# Patient Record
Sex: MALE | URBAN - METROPOLITAN AREA
[De-identification: names, ages, dates, MRNs, and addresses within clinical notes are randomized per-mention and may not be internally consistent; named-entity substitution may affect disease eponyms.]

---

## 2022-07-19 ENCOUNTER — TELEPHONE (OUTPATIENT)
Dept: OBGYN CLINIC | Facility: HOSPITAL | Age: 52
End: 2022-07-19

## 2022-07-19 NOTE — TELEPHONE ENCOUNTER
Force on request sent to [facility],  Please advise if the following patient can be forced onto the schedule:    Patient: Carmenza Srivastava    : 1970    MRN: [id number]      Call back #: [phone]    Insurance: [facility]    Reason for appointment: Bone on bone in thumb joints    Requested doctor/location: Kalia/Vlad//[place]      Thank you

## 2023-10-18 ENCOUNTER — HOSPITAL ENCOUNTER (OUTPATIENT)
Dept: RADIOLOGY | Facility: HOSPITAL | Age: 53
Discharge: HOME | End: 2023-10-18
Attending: ORTHOPAEDIC SURGERY
Payer: COMMERCIAL

## 2023-10-18 ENCOUNTER — OFFICE VISIT (OUTPATIENT)
Dept: OBGYN CLINIC | Facility: HOSPITAL | Age: 53
End: 2023-10-18

## 2023-10-18 ENCOUNTER — TELEPHONE (OUTPATIENT)
Dept: OBGYN CLINIC | Facility: HOSPITAL | Age: 53
End: 2023-10-18

## 2023-10-18 VITALS
DIASTOLIC BLOOD PRESSURE: 97 MMHG | HEART RATE: 60 BPM | BODY MASS INDEX: 41.83 KG/M2 | SYSTOLIC BLOOD PRESSURE: 157 MMHG | HEIGHT: 71 IN | WEIGHT: 298.8 LBS

## 2023-10-18 DIAGNOSIS — M19.042 DEGENERATIVE ARTHRITIS OF METACARPOPHALANGEAL JOINT OF LEFT THUMB: ICD-10-CM

## 2023-10-18 DIAGNOSIS — M79.641 RIGHT HAND PAIN: ICD-10-CM

## 2023-10-18 DIAGNOSIS — M79.642 PAIN OF LEFT HAND: Primary | ICD-10-CM

## 2023-10-18 DIAGNOSIS — M79.642 PAIN OF LEFT HAND: ICD-10-CM

## 2023-10-18 DIAGNOSIS — M19.041 DEGENERATIVE ARTHRITIS OF METACARPOPHALANGEAL JOINT OF RIGHT THUMB: ICD-10-CM

## 2023-10-18 PROCEDURE — 73130 X-RAY EXAM OF HAND: CPT

## 2023-10-18 RX ORDER — LOSARTAN POTASSIUM 25 MG/1
TABLET ORAL
COMMUNITY
Start: 2023-09-18

## 2023-10-18 RX ADMIN — LIDOCAINE HYDROCHLORIDE 1 ML: 10 INJECTION, SOLUTION INFILTRATION; PERINEURAL at 14:45

## 2023-10-18 RX ADMIN — BETAMETHASONE SODIUM PHOSPHATE AND BETAMETHASONE ACETATE 3 MG: 3; 3 INJECTION, SUSPENSION INTRA-ARTICULAR; INTRALESIONAL; INTRAMUSCULAR; SOFT TISSUE at 14:45

## 2023-10-18 NOTE — PROGRESS NOTES
ASSESSMENT/PLAN:    Assessment:   Arthritis of the MP joint  bilateral    Plan:   New xrays of the right thumb and left thumb were obtained and reviewed at today's visit  Conservative treatments were discussed with the patient that include trying another cortisone injection, custom fit a brace that would mimic a fusion at the MP joint. Wear the brace for labor intensive activities. Patient wishes to proceed with a custom made brace made in occupational therapy to mimic a MP joint fusion for both thumbs. Continue with the heat in the morning and evenings along with trying topical Voltaren gel only at night. If the injections for the MP joint did not give him significant relief, then consider bilateral thumb CMC joint injections due to the pain possibly radiating to MP joint. Surgical intervention fusion of the MP joint    Follow Up:  8  week(s)    To Do Next Visit:  Re-evaluate how the custom made splints helped his pain along with the injection    General Discussions:  Osteoarthritis:  The anatomy and physiology of osteoarthritis was discussed with the patient today in the office. Deterioration of the articular cartilage eventually leads to altered mobility at the joint, resulting in joint subluxation, osteophyte formation, cystic changes, as well as subchondral sclerosis. Eventually, pain, limited mobility, and compensatory hypermobility at surrounding joints may develop. While normal activity and usage of the joint may provide a painful experience to the patient, this typically does not result in damage to the limb. Treatment options include splints to decreased joint edema, pain, and inflammation. Therapy exercises to strengthen the surrounding musculature may relieve pain, but do not alter the overall continued development of osteoarthritis. Oral medications, topical medications, corticosteroid injections may decrease pain and increase overall function.   Eventually, some patients may require surgical intervention. _____________________________________________________  CHIEF COMPLAINT:  Chief Complaint   Patient presents with    Left Hand - Pain    Right Hand - Pain         SUBJECTIVE:  Susan Gil is a 46 y.o. male who presents with Pain  Moderate  Constant  Dull and Aching and Stiffness/LROM to the bilateral thumb. This started  6 month(s) ago as Chronic. He was previously treated treated OA with Dr. Tiburcio Swift who provided cortisone injections into the MP joints of bilateral thumbs. The injections did not give him significant relief along with the thumb Comfort Cool. He recommended a MP joint fusion at that time. Patient wished to seek a second opinion. Radiation: None  Previous Treatments: steroid injections, NSAIDs, bracing, and pain relief creams without relief  Associated symptoms: Stiffness/LROM  Handedness: right  Work status: [address] [employer]    PAST MEDICAL HISTORY:  History reviewed. No pertinent past medical history. PAST SURGICAL HISTORY:  History reviewed. No pertinent surgical history. FAMILY HISTORY:  History reviewed. No pertinent family history. SOCIAL HISTORY:  Social History     Tobacco Use    Smoking status: Never    Smokeless tobacco: Never       MEDICATIONS:    Current Outpatient Medications:     losartan (COZAAR) 25 mg tablet, , Disp: , Rfl:     ALLERGIES:  No Known Allergies    REVIEW OF SYSTEMS:  Pertinent items are noted in HPI. A comprehensive review of systems was negative.     LABS:  HgA1c: No results found for: "HGBA1C"  BMP: No results found for: "GLUCOSE", "CALCIUM", "NA", "K", "CO2", "CL", "BUN", "CREATININE"      _____________________________________________________  PHYSICAL EXAMINATION:  Vital signs: /97   Pulse 60   Ht 5' 11" (1.803 m)   Wt 136 kg (298 lb 12.8 oz)   BMI 41.67 kg/m²   General: well developed and well nourished, alert, oriented times 3, and appears comfortable  Psychiatric: Normal  HEENT: Trachea Midline, No torticollis  Cardiovascular: No discernable arrhythmia  Pulmonary: No wheezing or stridor  Abdomen: No rebound or guarding  Extremities: No peripheral edema  Skin: No masses, erythema, lacerations, fluctation, ulcerations  Neurovascular: Sensation Intact to the Median, Ulnar, Radial Nerve, Motor Intact to the Median, Ulnar, Radial Nerve, and Pulses Intact    MUSCULOSKELETAL EXAMINATION:    right CMC Exam:  No adduction contracture  No hyperextension deformity of MCP joint  Negative localized tenderness over radial and dorsal aspect of thumb (CMC joint)  Grind test is negative for pain and Positive for crepitus  Metacarpal load shift test Positive  No triggering or tenderness over the A1 pulley  negative pain with Finkelstein’s maneuver     No radial or ulnar collateral ligaments laxity of IP and MP joint  Flexion and extension intact    left CMC Exam:  No adduction contracture  No hyperextension deformity of MCP joint  Negative localized tenderness over radial and dorsal aspect of thumb (CMC joint)  Grind test is negative for pain and Positive for crepitus  Metacarpal load shift test positive  No triggering or tenderness over the A1 pulley  Negative pain with Finkelstein’s maneuver         _____________________________________________________  STUDIES REVIEWED:  Images were reviewed in PACS by Dr. Esau Romeo and demonstrate: Xrays of the right hand 3 views: Significant osteoarthritis localized into the carpometacarpal joint and metacarpal phalangeal joint. Ulnar deviation of the metacarpal phalangeal joint. Free osteophytes noted. Xrays of the left hand 3 views: Significant osteoarthritis into the metacarpal phalangeal joint and carpometacarpal joint. Joint space loss with subchondral sclerosis noted. PROCEDURES PERFORMED:  Small joint arthrocentesis: L thumb MCP  Universal Protocol:  Consent: Verbal consent obtained.   Risks and benefits: risks, benefits and alternatives were discussed  Consent given by: patient  Time out: Immediately prior to procedure a "time out" was called to verify the correct patient, procedure, equipment, support staff and site/side marked as required. Timeout called at: 10/18/2023 3:42 PM.  Patient understanding: patient states understanding of the procedure being performed  Site marked: the operative site was marked  Patient identity confirmed: verbally with patient  Supporting Documentation  Indications: pain   Procedure Details  Location: thumb - L thumb MCP  Preparation: Patient was prepped and draped in the usual sterile fashion  Needle size: 25 G  Ultrasound guidance: no  Approach: radial  Medications administered: 1 mL lidocaine 1 %; 3 mg betamethasone acetate-betamethasone sodium phosphate 6 (3-3) mg/mL    Patient tolerance: patient tolerated the procedure well with no immediate complications  Dressing:  Sterile dressing applied      Small joint arthrocentesis: R thumb MCP  Universal Protocol:  Consent: Verbal consent obtained. Risks and benefits: risks, benefits and alternatives were discussed  Consent given by: patient  Time out: Immediately prior to procedure a "time out" was called to verify the correct patient, procedure, equipment, support staff and site/side marked as required.   Timeout called at: 10/18/2023 3:42 PM.  Patient understanding: patient states understanding of the procedure being performed  Site marked: the operative site was marked  Patient identity confirmed: verbally with patient  Supporting Documentation  Indications: pain   Procedure Details  Location: thumb - R thumb MCP  Preparation: Patient was prepped and draped in the usual sterile fashion  Needle size: 25 G  Ultrasound guidance: no  Approach: radial  Medications administered: 1 mL lidocaine 1 %; 3 mg betamethasone acetate-betamethasone sodium phosphate 6 (3-3) mg/mL    Patient tolerance: patient tolerated the procedure well with no immediate complications  Dressing:  Sterile dressing applied        Scribe Attestation      I,:  Adebayo Diaz am acting as a scribe while in the presence of the attending physician.:       I,:  Diana Schaeffer MD personally performed the services described in this documentation    as scribed in my presence.:            Chilo Schafer,:  Adebayo Diaz am acting as a scribe while in the presence of the attending physician.:       I,:  Diana Schaeffer MD personally performed the services described in this documentation    as scribed in my presence.:

## 2023-10-18 NOTE — TELEPHONE ENCOUNTER
Hello,    Patient saw Dr. Aura Ward today and needs an 8 week follow up. Can you help with scheduling an appointment? Thank you!

## 2023-10-19 ENCOUNTER — OFFICE VISIT (OUTPATIENT)
Dept: PHYSICAL THERAPY | Facility: MEDICAL CENTER | Age: 53
End: 2023-10-19
Payer: COMMERCIAL

## 2023-10-19 DIAGNOSIS — M19.042 DEGENERATIVE ARTHRITIS OF METACARPOPHALANGEAL JOINT OF LEFT THUMB: Primary | ICD-10-CM

## 2023-10-19 DIAGNOSIS — M19.041 DEGENERATIVE ARTHRITIS OF METACARPOPHALANGEAL JOINT OF RIGHT THUMB: ICD-10-CM

## 2023-10-19 PROCEDURE — L3913 HFO W/O JOINTS CF: HCPCS | Performed by: PHYSICAL THERAPIST

## 2023-10-19 RX ORDER — BETAMETHASONE SODIUM PHOSPHATE AND BETAMETHASONE ACETATE 3; 3 MG/ML; MG/ML
3 INJECTION, SUSPENSION INTRA-ARTICULAR; INTRALESIONAL; INTRAMUSCULAR; SOFT TISSUE
Status: COMPLETED | OUTPATIENT
Start: 2023-10-18 | End: 2023-10-18

## 2023-10-19 RX ORDER — LIDOCAINE HYDROCHLORIDE 10 MG/ML
1 INJECTION, SOLUTION INFILTRATION; PERINEURAL
Status: COMPLETED | OUTPATIENT
Start: 2023-10-18 | End: 2023-10-18

## 2023-10-19 NOTE — PROGRESS NOTES
Splint    Diagnosis:   1. 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 Degenerative arthritis of metacarpophalangeal joint of right thumb  Ambulatory Referral to PT/OT Hand Therapy         Indication: Functioning/pain relief    Location:  BL thumbs  Supplies: Orthotics  Thermoplastic material    Splint type: MCP joint protective splint  Wearing Schedule: Remove for hygiene only  Describe Position: MCP joint placed in about 30-35 degrees of flexion, CMC and IP free    Precautions: Skin/Universal precautions    Patient or Caregiver expresses understanding of wearing Schedule and Precautions? Yes  Patient or Caregiver able to don/doff orthotic independently? Yes    Written orders provided to patient?  Yes  Orders Obtained: Written  Orders Obtained from: Prasanna Escalona MD

## 2024-02-28 ENCOUNTER — OFFICE VISIT (OUTPATIENT)
Dept: OBGYN CLINIC | Facility: HOSPITAL | Age: 54
End: 2024-02-28
Payer: COMMERCIAL

## 2024-02-28 VITALS
DIASTOLIC BLOOD PRESSURE: 88 MMHG | HEART RATE: 73 BPM | HEIGHT: 71 IN | SYSTOLIC BLOOD PRESSURE: 169 MMHG | WEIGHT: 297.6 LBS | BODY MASS INDEX: 41.66 KG/M2

## 2024-02-28 DIAGNOSIS — M18.0 ARTHRITIS OF CARPOMETACARPAL (CMC) JOINT OF BOTH THUMBS: Primary | ICD-10-CM

## 2024-02-28 DIAGNOSIS — M79.2 NEURITIS: ICD-10-CM

## 2024-02-28 DIAGNOSIS — M19.042 DEGENERATIVE ARTHRITIS OF METACARPOPHALANGEAL JOINT OF LEFT THUMB: ICD-10-CM

## 2024-02-28 DIAGNOSIS — M19.041 DEGENERATIVE ARTHRITIS OF METACARPOPHALANGEAL JOINT OF RIGHT THUMB: ICD-10-CM

## 2024-02-28 PROCEDURE — 20600 DRAIN/INJ JOINT/BURSA W/O US: CPT | Performed by: ORTHOPAEDIC SURGERY

## 2024-02-28 PROCEDURE — 99214 OFFICE O/P EST MOD 30 MIN: CPT | Performed by: ORTHOPAEDIC SURGERY

## 2024-02-28 RX ADMIN — BETAMETHASONE SODIUM PHOSPHATE AND BETAMETHASONE ACETATE 3 MG: 3; 3 INJECTION, SUSPENSION INTRA-ARTICULAR; INTRALESIONAL; INTRAMUSCULAR; SOFT TISSUE at 15:15

## 2024-02-28 RX ADMIN — LIDOCAINE HYDROCHLORIDE 0.5 ML: 10 INJECTION, SOLUTION INFILTRATION; PERINEURAL at 15:15

## 2024-02-28 NOTE — PROGRESS NOTES
ASSESSMENT/PLAN:    Assessment:   Bilateral thumb MP joint arthritis  Bilateral thumb CMC arthritis  Left carpal tunnel transient neuritis    Plan:   Discussed treatment options including continued observation, activity modification, bracing, anti-inflammatories, hand therapy, cortisone injection, versus surgical intervention.  Cortisone injections administered to the bilateral thumb CMC joints today using betamethasone which were beneficial for him immediately following the procedure.  He may continue use of Comfort Cool braces as needed. Reviewed that if symptoms of his carpal tunnel transient neuritis persist consider ultrasound of the carpal tunnel for further evaluation.     Follow Up:  3  month(s)    To Do Next Visit:  Reevaluation    General Discussions:  CMC Arthritis: The anatomy and physiology of carpometacarpal joint arthritis was discussed with the patient today in the office.  Deterioration of the articular cartilage eventually leads to hypermobility at the thumb CMC joint, resulting in joint subluxation, osteophyte formation, cystic changes within the trapezium and base of the first metacarpal, as well as subchondral sclerosis.  Eventually, pain, limited mobility, and compensatory hyperextension at the metacarpophalangeal joint may develop.  While normal activity and usage of the thumb joint may provide a painful experience to the patient, this typically does not result in damage to the thumb or hand.  Treatment options include resting thumb spica splints to decreased joint edema, pain, and inflammation.  Therapy exercises to strengthen the thenar musculature may relieve pain, but do not alter the overall continued development of osteoarthritis.  Oral medications, topical medications, corticosteroid injections may decrease pain and increase overall function.  Eventually, approximately 5% of patients may require surgical intervention.     _____________________________________________________  CHIEF  COMPLAINT:  Chief Complaint   Patient presents with    Left Thumb - Follow-up     MCP- Beta     Right Thumb - Follow-up     MCP- Beta      SUBJECTIVE:  Fredo Blount is a 53 y.o. male who presents for follow up regarding bilateral thumb MP joint arthritis.  At last visit he received cortisone injections to bilateral thumb MP joints which were not beneficial.  He also received custom made thumb braces through occupational therapy which he notes have since broken.  He continues to experience bilateral thumb pain made worse with activities and cold weather.  He also notes numbness and tingling into his small and ring fingers following in injury roughly 1 month ago.  He notes that this has started to resolve.    PAST MEDICAL HISTORY:  Past Medical History:   Diagnosis Date    Dysphagia     Esophageal dysmotility.  Eosinophilic esophagitis.  History of EGDs with Dr Colindres with empiric dilatation in 2009, 2011, 2012.    Mediastinal lymphadenopathy due to sarcoidosis     GABRIEL (obstructive sleep apnea) 12/5/2016    Last Assessment & Plan:  Formatting of this note might be different from the original. Very severe obstructive sleep apnea syndrome with very severe episodic hypoxia.  Will initiate auto CPAP at a range of 8-20 CWP.  The patient will use an interface of his choice.  Nightly use of CPAP and weight loss are recommended.       PAST SURGICAL HISTORY:  Past Surgical History:   Procedure Laterality Date    COLONOSCOPY  12/2015    Dr Goodwin at [facility]. Hyperplastic sigmoid colon polyp removed, otherwise normal.    EGD  2012    Dr Colindres, [facility]. Food bolus impaction, food at distal esophagus, pushed into stomach.    EGD  07/2011    Dr Colindres, [facility].  Dysphagia symptoms, no esophageal obstruction, esophageal dysmotility, empirically dilatated with 53 [place].  Biopsies eosinophilic esophagitis.    EGD  2009    Dr Colindres, [facility]. Symptoms of dysphagia, empirically dilatated with 53 [place].  History of eosinophilic esophagitis.  "      FAMILY HISTORY:  Family History   Problem Relation Age of Onset    Colon polyps Father     Colon cancer Paternal Grandfather     Stomach cancer Maternal Grandmother     Colon cancer Maternal Grandfather        SOCIAL HISTORY:  Social History     Tobacco Use    Smoking status: Never    Smokeless tobacco: Never   Substance Use Topics    Alcohol use: Yes       MEDICATIONS:    Current Outpatient Medications:     Ascorbic Acid (Vitamin C) 500 MG CAPS, Take 2,000 mg by mouth daily, Disp: , Rfl:     B COMPLEX-C PO, Take 2 capsules by mouth daily, Disp: , Rfl:     COLLAGEN PO, Take by mouth, Disp: , Rfl:     Glutamine 500 MG CAPS, Take by mouth, Disp: , Rfl:     losartan (COZAAR) 25 mg tablet, , Disp: , Rfl:     Probiotic Product (PROBIOTIC MATURE ADULT PO), Take 2 capsules by mouth daily, Disp: , Rfl:     Ashwagandha 500 MG CAPS, Take by mouth, Disp: , Rfl:     Sodium Sulfate-Mag Sulfate-KCl (Sutab) 4082-265-681 MG TABS, Take 1 kit by mouth see administration instructions, Disp: 24 tablet, Rfl: 0    ALLERGIES:  No Known Allergies    REVIEW OF SYSTEMS:  Pertinent items are noted in HPI.  A comprehensive review of systems was negative.    LABS:  HgA1c:   Lab Results   Component Value Date    HGBA1C 6.2 (H) 02/10/2024     BMP:   Lab Results   Component Value Date    CALCIUM 9.2 02/10/2024    K 4.6 02/10/2024    CO2 27 02/10/2024     02/10/2024    BUN 27 02/10/2024    CREATININE 0.94 02/10/2024     _____________________________________________________  PHYSICAL EXAMINATION:  Vital signs: /88   Pulse 73   Ht 5' 11\" (1.803 m)   Wt 135 kg (297 lb 9.6 oz)   BMI 41.51 kg/m²   General: well developed and well nourished, alert, oriented times 3, and appears comfortable  Psychiatric: Normal  HEENT: Trachea Midline, No torticollis  Cardiovascular: No discernable arrhythmia  Pulmonary: No wheezing or stridor  Abdomen: No rebound or guarding  Extremities: No peripheral edema  Skin: No masses, erythema, lacerations, " "fluctation, ulcerations  Neurovascular: Sensation Intact to the Median, Ulnar, Radial Nerve, Motor Intact to the Median, Ulnar, Radial Nerve, and Pulses Intact    MUSCULOSKELETAL EXAMINATION:  Bilateral thumbs: tender to palpation over MP and CMC joints, no hyperextension, positive grind, positive crepitus, positive metacarpal load shift test, no triggering    Left wrist: no clawing, no wartenburg, no atrophy, 5/5 AIN, 5/5 APB, 5/5 intrinsics, 5/5 wrist flexion and extension, positive tinel's at wrist    _____________________________________________________  STUDIES REVIEWED:  No Studies to review      PROCEDURES PERFORMED:  Small joint arthrocentesis: bilateral thumb CMC  Universal Protocol:  Consent: Verbal consent obtained.  Risks and benefits: risks, benefits and alternatives were discussed  Consent given by: patient  Time out: Immediately prior to procedure a \"time out\" was called to verify the correct patient, procedure, equipment, support staff and site/side marked as required.  Timeout called at: 2/28/2024 3:49 PM.  Patient understanding: patient states understanding of the procedure being performed  Site marked: the operative site was marked  Required items: required blood products, implants, devices, and special equipment available  Patient identity confirmed: verbally with patient  Supporting Documentation  Indications: pain   Procedure Details  Location: thumb - bilateral thumb CMC  Preparation: Patient was prepped and draped in the usual sterile fashion  Needle size: 25 G  Ultrasound guidance: no    Medications (Right): 0.5 mL lidocaine 1 %; 3 mg betamethasone acetate-betamethasone sodium phosphate 6 (3-3) mg/mLMedications (Left): 0.5 mL lidocaine 1 %; 3 mg betamethasone acetate-betamethasone sodium phosphate 6 (3-3) mg/mL   Patient tolerance: patient tolerated the procedure well with no immediate complications  Dressing:  Sterile dressing applied        Scribe Attestation      I,:  Kathy Acevedo am " acting as a scribe while in the presence of the attending physician.:       I,:  Lopez Chu MD personally performed the services described in this documentation    as scribed in my presence.:

## 2024-03-04 RX ORDER — BETAMETHASONE SODIUM PHOSPHATE AND BETAMETHASONE ACETATE 3; 3 MG/ML; MG/ML
3 INJECTION, SUSPENSION INTRA-ARTICULAR; INTRALESIONAL; INTRAMUSCULAR; SOFT TISSUE
Status: COMPLETED | OUTPATIENT
Start: 2024-02-28 | End: 2024-02-28

## 2024-03-04 RX ORDER — LIDOCAINE HYDROCHLORIDE 10 MG/ML
0.5 INJECTION, SOLUTION INFILTRATION; PERINEURAL
Status: COMPLETED | OUTPATIENT
Start: 2024-02-28 | End: 2024-02-28

## 2024-05-21 ENCOUNTER — TELEPHONE (OUTPATIENT)
Dept: OBGYN CLINIC | Facility: HOSPITAL | Age: 54
End: 2024-05-21

## 2024-05-21 NOTE — TELEPHONE ENCOUNTER
Called patient to reschedule appointment for 5/29/24 with Dr. Chu, no answer. Left message for patient notifying him that Dr. Chu will not be in the office this day and his appointment was moved to 9:30 in the morning on the same day with Maykel Turcios. Asked patient call back if he has any questions or concerns.

## 2024-05-29 ENCOUNTER — OFFICE VISIT (OUTPATIENT)
Dept: OBGYN CLINIC | Facility: HOSPITAL | Age: 54
End: 2024-05-29
Payer: COMMERCIAL

## 2024-05-29 VITALS — WEIGHT: 301 LBS | BODY MASS INDEX: 42.14 KG/M2 | HEIGHT: 71 IN

## 2024-05-29 DIAGNOSIS — G56.02 CARPAL TUNNEL SYNDROME ON LEFT: Primary | ICD-10-CM

## 2024-05-29 DIAGNOSIS — M18.0 ARTHRITIS OF CARPOMETACARPAL (CMC) JOINT OF BOTH THUMBS: ICD-10-CM

## 2024-05-29 DIAGNOSIS — G56.22 CUBITAL TUNNEL SYNDROME ON LEFT: ICD-10-CM

## 2024-05-29 PROCEDURE — 20600 DRAIN/INJ JOINT/BURSA W/O US: CPT | Performed by: PHYSICIAN ASSISTANT

## 2024-05-29 PROCEDURE — 99214 OFFICE O/P EST MOD 30 MIN: CPT | Performed by: PHYSICIAN ASSISTANT

## 2024-05-29 RX ORDER — LIDOCAINE HYDROCHLORIDE 10 MG/ML
0.5 INJECTION, SOLUTION INFILTRATION; PERINEURAL
Status: COMPLETED | OUTPATIENT
Start: 2024-05-29 | End: 2024-05-29

## 2024-05-29 RX ORDER — BETAMETHASONE SODIUM PHOSPHATE AND BETAMETHASONE ACETATE 3; 3 MG/ML; MG/ML
3 INJECTION, SUSPENSION INTRA-ARTICULAR; INTRALESIONAL; INTRAMUSCULAR; SOFT TISSUE
Status: COMPLETED | OUTPATIENT
Start: 2024-05-29 | End: 2024-05-29

## 2024-05-29 RX ADMIN — LIDOCAINE HYDROCHLORIDE 0.5 ML: 10 INJECTION, SOLUTION INFILTRATION; PERINEURAL at 09:30

## 2024-05-29 RX ADMIN — BETAMETHASONE SODIUM PHOSPHATE AND BETAMETHASONE ACETATE 3 MG: 3; 3 INJECTION, SUSPENSION INTRA-ARTICULAR; INTRALESIONAL; INTRAMUSCULAR; SOFT TISSUE at 09:30

## 2024-05-29 NOTE — PROGRESS NOTES
ASSESSMENT/PLAN:    Assessment:   Bilateral thumb MP joint arthritis  Bilateral thumb CMC arthritis  Left carpal tunnel  Left cubital tunnel    Plan:   Patient was given bilateral thumb CMC cortisone injections today with betamethasone.  He tolerated well.  He states that he has been having some numbness and tingling into all of the fingers.  Most prominently over the ring finger.  Ultrasounds were ordered of the left carpal tunnel and cubital tunnel to evaluate  He was advised that we can repeat the cortisone injections at the base of the thumbs for every 3 months as needed for pain  He will follow-up after the ultrasound score test results and treatment options    Follow Up:  After ultrasounds    To Do Next Visit:  Reevaluation    General Discussions:  CMC Arthritis: The anatomy and physiology of carpometacarpal joint arthritis was discussed with the patient today in the office.  Deterioration of the articular cartilage eventually leads to hypermobility at the thumb CMC joint, resulting in joint subluxation, osteophyte formation, cystic changes within the trapezium and base of the first metacarpal, as well as subchondral sclerosis.  Eventually, pain, limited mobility, and compensatory hyperextension at the metacarpophalangeal joint may develop.  While normal activity and usage of the thumb joint may provide a painful experience to the patient, this typically does not result in damage to the thumb or hand.  Treatment options include resting thumb spica splints to decreased joint edema, pain, and inflammation.  Therapy exercises to strengthen the thenar musculature may relieve pain, but do not alter the overall continued development of osteoarthritis.  Oral medications, topical medications, corticosteroid injections may decrease pain and increase overall function.  Eventually, approximately 5% of patients may require surgical intervention.     _____________________________________________________  CHIEF  COMPLAINT:  Chief Complaint   Patient presents with    Right Thumb - Pain     CMC - Beta    Left Thumb - Pain     CMC - Beta    Left Wrist - Follow-up     carpal tunnel transient neuritis     SUBJECTIVE:  Fredo Blount is a 54 y.o. male who presents for follow up regarding bilateral thumb CMC arthritis.  He states that the injections only lasted about 1 month.  He still notes pain at the base of both of his thumbs.  He states that he is also noted numbness and tingling into the whole left hand.  He states that recently he was holding a safe and it fell and he injured his hand and had a hyperextension mechanism of the digits, wrist and he felt a pulling sensation up to his elbow.  He has noted numbness and tingling into the ring finger since that time.  He has also noted some weakness into the left hand.    PAST MEDICAL HISTORY:  Past Medical History:   Diagnosis Date    Dysphagia     Esophageal dysmotility.  Eosinophilic esophagitis.  History of EGDs with Dr Colindres with empiric dilatation in 2009, 2011, 2012.    Mediastinal lymphadenopathy due to sarcoidosis     GABRIEL (obstructive sleep apnea) 12/5/2016    Last Assessment & Plan:  Formatting of this note might be different from the original. Very severe obstructive sleep apnea syndrome with very severe episodic hypoxia.  Will initiate auto CPAP at a range of 8-20 CWP.  The patient will use an interface of his choice.  Nightly use of CPAP and weight loss are recommended.       PAST SURGICAL HISTORY:  Past Surgical History:   Procedure Laterality Date    COLONOSCOPY  12/2015    Dr Goodwin at [facility]. Hyperplastic sigmoid colon polyp removed, otherwise normal.    EGD  2012    Dr Colindres, [facility]. Food bolus impaction, food at distal esophagus, pushed into stomach.    EGD  07/2011    Dr Colindres, [facility].  Dysphagia symptoms, no esophageal obstruction, esophageal dysmotility, empirically dilatated with 54 Tajik.  Biopsies eosinophilic esophagitis.    EGD  2009    Dr Colindres, [facility].  "Symptoms of dysphagia, empirically dilatated with 54 Kisw[place]ili.  History of eosinophilic esophagitis.       FAMILY HISTORY:  Family History   Problem Relation Age of Onset    Colon polyps Father     Colon cancer Paternal Grandfather     Stomach cancer Maternal Grandmother     Colon cancer Maternal Grandfather        SOCIAL HISTORY:  Social History     Tobacco Use    Smoking status: Never    Smokeless tobacco: Never   Substance Use Topics    Alcohol use: Yes       MEDICATIONS:    Current Outpatient Medications:     Ascorbic Acid (Vitamin C) 500 MG CAPS, Take 2,000 mg by mouth daily, Disp: , Rfl:     Ashwagandha 500 MG CAPS, Take by mouth, Disp: , Rfl:     B COMPLEX-C PO, Take 2 capsules by mouth daily, Disp: , Rfl:     COLLAGEN PO, Take by mouth, Disp: , Rfl:     Glutamine 500 MG CAPS, Take by mouth, Disp: , Rfl:     losartan (COZAAR) 25 mg tablet, , Disp: , Rfl:     Probiotic Product (PROBIOTIC MATURE ADULT PO), Take 2 capsules by mouth daily, Disp: , Rfl:     Sodium Sulfate-Mag Sulfate-KCl (Sutab) 3769-345-223 MG TABS, Take 1 kit by mouth see administration instructions, Disp: 24 tablet, Rfl: 0    ALLERGIES:  No Known Allergies    REVIEW OF SYSTEMS:  Pertinent items are noted in HPI.  A comprehensive review of systems was negative.    LABS:  HgA1c:   Lab Results   Component Value Date    HGBA1C 6.2 (H) 02/10/2024     BMP:   Lab Results   Component Value Date    CALCIUM 9.2 02/10/2024    K 4.6 02/10/2024    CO2 27 02/10/2024     02/10/2024    BUN 27 02/10/2024    CREATININE 0.94 02/10/2024     _____________________________________________________  PHYSICAL EXAMINATION:  Vital signs: Ht 5' 11\" (1.803 m)   Wt (!) 137 kg (301 lb)   BMI 41.98 kg/m²   General: well developed and well nourished, alert, oriented times 3, and appears comfortable  Psychiatric: Normal  HEENT: Trachea Midline, No torticollis  Cardiovascular: No discernable arrhythmia  Pulmonary: No wheezing or stridor  Abdomen: No rebound or " "guarding  Extremities: No peripheral edema  Skin: No masses, erythema, lacerations, fluctation, ulcerations  Neurovascular: Sensation Intact to the Median, Ulnar, Radial Nerve, Motor Intact to the Median, Ulnar, Radial Nerve, and Pulses Intact    MUSCULOSKELETAL EXAMINATION:  Bilateral thumbs: tender to palpation over MP and CMC joints, no hyperextension, positive grind, positive crepitus, positive metacarpal load shift test, no triggering    Left wrist: no clawing, no wartenburg, no atrophy, 5/5 AIN, 5/5 APB, 5/5 intrinsics, 5/5 wrist flexion and extension, positive tinel's at wrist    Positive Tinel's at the elbow over the ulnar nerve      _____________________________________________________  STUDIES REVIEWED:  No Studies to review      PROCEDURES PERFORMED:  Small joint arthrocentesis: bilateral thumb CMC  Universal Protocol:  Consent: Verbal consent obtained.  Risks and benefits: risks, benefits and alternatives were discussed  Consent given by: patient  Time out: Immediately prior to procedure a \"time out\" was called to verify the correct patient, procedure, equipment, support staff and site/side marked as required.  Timeout called at: 2/28/2024 3:49 PM.  Patient understanding: patient states understanding of the procedure being performed  Site marked: the operative site was marked  Required items: required blood products, implants, devices, and special equipment available  Patient identity confirmed: verbally with patient  Supporting Documentation  Indications: pain   Procedure Details  Location: thumb - bilateral thumb CMC  Preparation: Patient was prepped and draped in the usual sterile fashion  Needle size: 25 G  Ultrasound guidance: no    Medications (Right): 0.5 mL lidocaine 1 %; 3 mg betamethasone acetate-betamethasone sodium phosphate 6 (3-3) mg/mLMedications (Left): 0.5 mL lidocaine 1 %; 3 mg betamethasone acetate-betamethasone sodium phosphate 6 (3-3) mg/mL   Patient tolerance: patient tolerated the " procedure well with no immediate complications  Dressing:  Sterile dressing applied

## 2024-06-10 ENCOUNTER — HOSPITAL ENCOUNTER (OUTPATIENT)
Dept: ULTRASOUND IMAGING | Facility: HOSPITAL | Age: 54
Discharge: HOME | End: 2024-06-10
Payer: COMMERCIAL

## 2024-06-10 DIAGNOSIS — G56.22 CUBITAL TUNNEL SYNDROME ON LEFT: ICD-10-CM

## 2024-06-10 DIAGNOSIS — G56.02 CARPAL TUNNEL SYNDROME ON LEFT: ICD-10-CM

## 2024-06-10 PROCEDURE — 76882 US LMTD JT/FCL EVL NVASC XTR: CPT

## 2024-06-19 ENCOUNTER — OFFICE VISIT (OUTPATIENT)
Dept: OBGYN CLINIC | Facility: HOSPITAL | Age: 54
End: 2024-06-19
Payer: COMMERCIAL

## 2024-06-19 VITALS
WEIGHT: 294.8 LBS | BODY MASS INDEX: 41.27 KG/M2 | DIASTOLIC BLOOD PRESSURE: 89 MMHG | HEIGHT: 71 IN | SYSTOLIC BLOOD PRESSURE: 177 MMHG | HEART RATE: 61 BPM

## 2024-06-19 DIAGNOSIS — G56.22 CUBITAL TUNNEL SYNDROME ON LEFT: ICD-10-CM

## 2024-06-19 DIAGNOSIS — G56.02 CARPAL TUNNEL SYNDROME ON LEFT: Primary | ICD-10-CM

## 2024-06-19 PROCEDURE — 99214 OFFICE O/P EST MOD 30 MIN: CPT | Performed by: PHYSICIAN ASSISTANT

## 2024-06-19 NOTE — PROGRESS NOTES
ASSESSMENT/PLAN:    Assessment:   Bilateral thumb MP joint arthritis  Bilateral thumb CMC arthritis  Left carpal tunnel  Left cubital tunnel    Plan:   Ultrasounds were reviewed with the patient in detail  It was discussed that he does have a subluxation on exam of either the ulnar nerve or the medial head of the triceps  He would like to try conservative treatment at this time as surgical intervention is difficult to plan  He was given a therapy prescription to work on nerve glides and stretching  He was advised to wear a neoprene sleeve at nighttime for his elbow with a sock in the antecubital fossa to help prevent hyperflexion of his elbow  He will follow-up in 8 weeks for reevaluation, discuss cortisone injections for his thumbs as well as to review symptoms for his cubital tunnel    Follow Up:  8 weeks    To Do Next Visit:  Reevaluation    General Discussions:  CMC Arthritis: The anatomy and physiology of carpometacarpal joint arthritis was discussed with the patient today in the office.  Deterioration of the articular cartilage eventually leads to hypermobility at the thumb CMC joint, resulting in joint subluxation, osteophyte formation, cystic changes within the trapezium and base of the first metacarpal, as well as subchondral sclerosis.  Eventually, pain, limited mobility, and compensatory hyperextension at the metacarpophalangeal joint may develop.  While normal activity and usage of the thumb joint may provide a painful experience to the patient, this typically does not result in damage to the thumb or hand.  Treatment options include resting thumb spica splints to decreased joint edema, pain, and inflammation.  Therapy exercises to strengthen the thenar musculature may relieve pain, but do not alter the overall continued development of osteoarthritis.  Oral medications, topical medications, corticosteroid injections may decrease pain and increase overall function.  Eventually, approximately 5% of  patients may require surgical intervention.     _____________________________________________________  CHIEF COMPLAINT:  Chief Complaint   Patient presents with    Left Wrist - Follow-up     US - 6/10  Carpal tunnel transient neuritis     SUBJECTIVE:  Fredo Blount is a 54 y.o. male who presents for follow up regarding ultrasounds for carpal tunnel and cubital tunnel.  He states he continues to have numbness and tingling into the ring finger only.  He states that the cortisone injections helped to mitigate some of the pain at the base of both of his thumbs.    PAST MEDICAL HISTORY:  Past Medical History:   Diagnosis Date    Dysphagia     Esophageal dysmotility.  Eosinophilic esophagitis.  History of EGDs with Dr Colindres with empiric dilatation in 2009, 2011, 2012.    Mediastinal lymphadenopathy due to sarcoidosis     GABRIEL (obstructive sleep apnea) 12/5/2016    Last Assessment & Plan:  Formatting of this note might be different from the original. Very severe obstructive sleep apnea syndrome with very severe episodic hypoxia.  Will initiate auto CPAP at a range of 8-20 CWP.  The patient will use an interface of his choice.  Nightly use of CPAP and weight loss are recommended.       PAST SURGICAL HISTORY:  Past Surgical History:   Procedure Laterality Date    COLONOSCOPY  12/2015    Dr Goodwin at [facility]. Hyperplastic sigmoid colon polyp removed, otherwise normal.    EGD  2012    Dr Colindres, [facility]. Food bolus impaction, food at distal esophagus, pushed into stomach.    EGD  07/2011    Dr Colindres, [facility].  Dysphagia symptoms, no esophageal obstruction, esophageal dysmotility, empirically dilatated with 54 Indonesian.  Biopsies eosinophilic esophagitis.    EGD  2009    Dr Colindres, [facility]. Symptoms of dysphagia, empirically dilatated with 54 Indonesian.  History of eosinophilic esophagitis.       FAMILY HISTORY:  Family History   Problem Relation Age of Onset    Colon polyps Father     Colon cancer Paternal Grandfather     Stomach cancer  "Maternal Grandmother     Colon cancer Maternal Grandfather        SOCIAL HISTORY:  Social History     Tobacco Use    Smoking status: Never    Smokeless tobacco: Never   Substance Use Topics    Alcohol use: Yes       MEDICATIONS:    Current Outpatient Medications:     Ascorbic Acid (Vitamin C) 500 MG CAPS, Take 2,000 mg by mouth daily, Disp: , Rfl:     Ashwagandha 500 MG CAPS, Take by mouth, Disp: , Rfl:     B COMPLEX-C PO, Take 2 capsules by mouth daily, Disp: , Rfl:     COLLAGEN PO, Take by mouth, Disp: , Rfl:     Glutamine 500 MG CAPS, Take by mouth, Disp: , Rfl:     losartan (COZAAR) 25 mg tablet, , Disp: , Rfl:     Probiotic Product (PROBIOTIC MATURE ADULT PO), Take 2 capsules by mouth daily, Disp: , Rfl:     Sodium Sulfate-Mag Sulfate-KCl (Sutab) 0946-504-653 MG TABS, Take 1 kit by mouth see administration instructions, Disp: 24 tablet, Rfl: 0    ALLERGIES:  No Known Allergies    REVIEW OF SYSTEMS:  Pertinent items are noted in HPI.  A comprehensive review of systems was negative.    LABS:  HgA1c:   Lab Results   Component Value Date    HGBA1C 6.2 (H) 02/10/2024     BMP:   Lab Results   Component Value Date    CALCIUM 9.2 02/10/2024    K 4.6 02/10/2024    CO2 27 02/10/2024     02/10/2024    BUN 27 02/10/2024    CREATININE 0.94 02/10/2024     _____________________________________________________  PHYSICAL EXAMINATION:  Vital signs: BP (!) 177/89   Pulse 61   Ht 5' 11\" (1.803 m)   Wt 134 kg (294 lb 12.8 oz)   BMI 41.12 kg/m²   General: well developed and well nourished, alert, oriented times 3, and appears comfortable  Psychiatric: Normal  HEENT: Trachea Midline, No torticollis  Cardiovascular: No discernable arrhythmia  Pulmonary: No wheezing or stridor  Abdomen: No rebound or guarding  Extremities: No peripheral edema  Skin: No masses, erythema, lacerations, fluctation, ulcerations  Neurovascular: Sensation Intact to the Median, Ulnar, Radial Nerve, Motor Intact to the Median, Ulnar, Radial Nerve, and " Pulses Intact    MUSCULOSKELETAL EXAMINATION:    Left wrist: no clawing, no wartenburg, no atrophy, 5/5 AIN, 5/5 APB, 5/5 intrinsics, 5/5 wrist flexion and extension, positive tinel's at wrist    Positive Tinel's at the elbow over the ulnar nerve  Subluxation of either the medial head of the triceps or the ulnar nerve is appreciated on exam with hyperflexion      _____________________________________________________  STUDIES REVIEWED:  Ultrasound of the carpal tunnel demonstrated 13.7 sqmm  Ultrasound of the cubital tunnel demonstrated cross-sectional area10.2 sqmm with ulnar nerve dislocation anteriorly and flexion and extension      PROCEDURES PERFORMED:  Procedures  No additional procedures were performed at today's visit

## 2024-07-02 ENCOUNTER — EVALUATION (OUTPATIENT)
Dept: PHYSICAL THERAPY | Facility: MEDICAL CENTER | Age: 54
End: 2024-07-02
Payer: COMMERCIAL

## 2024-07-02 DIAGNOSIS — G56.22 CUBITAL TUNNEL SYNDROME ON LEFT: Primary | ICD-10-CM

## 2024-07-02 DIAGNOSIS — G56.02 CARPAL TUNNEL SYNDROME ON LEFT: ICD-10-CM

## 2024-07-02 PROCEDURE — 97161 PT EVAL LOW COMPLEX 20 MIN: CPT | Performed by: PHYSICAL THERAPIST

## 2024-07-02 NOTE — PROGRESS NOTES
PT Evaluation     Today's date: 2024  Patient name: Fredo Blount  : 1970  MRN: [id number]  Referring provider: Omari Turcios PA-C  Dx:   Encounter Diagnosis     ICD-10-CM    1. Cubital tunnel syndrome on left  G56.22 Ambulatory Referral to PT/OT Hand Therapy      2. Carpal tunnel syndrome on left  G56.02 Ambulatory Referral to PT/OT Hand Therapy                     Assessment  Impairments: lacks appropriate home exercise program    Assessment details: Mr. Blount is a pleasant 54 y.o. RHD male who presents today with reports of chronic tingling in the left RF. No further referral appears necessary at this time based upon examination findings. Cervical spine cleared of proximal origin of symptoms. Dermatomes/myotomes intact and symmetrical bilateral UE. Monofilament testing WNL for ulnar and median nerve distributions of the hand. Unable to reproduce symptoms with compression tests and tinel's testing. He does have some adverse neural tension in the ulnar nerve distribution. We spent a majority of our time today discussion activity/behavior modifications, including minimizing positions of leaning on his elbows, prolonged elbow flexion, and using his hand (thenar region) as a hammer at work. He was provided with a tubigrip sleeve and foam for a night time elbow flexion block. He may substitute foam with a sock roll for comfort. He was instructed in nerve glides which did provide him with some relief. He will work on everything we discussed for 2-3 weeks and contact me if needed. Please contact me with any questions. Thank you for the referral.   Understanding of Dx/Px/POC: good     Prognosis: good    Plan    Planned therapy interventions: home exercise program    Treatment plan discussed with: patient      Subjective Evaluation    History of Present Illness  Mechanism of injury: Mr. Blount is a 54 y.o. male who presents today with reports of tingling in the left RF and SF. He reports onset of symptoms  "roughly 6 months ago. He did have one episode of the RF and SF hyperextended but denies any other injuries.  He denies any snapping at the elbow. He denies any loss of strength. He states tingling is constant and does not fluctuate throughout the day. He denies any neck pain. He reports occasional \"falling asleep\" of the first 3 digits. He does have a night brace that he has started using which helps.       MSK US suspicious for carpal tunnel syndrome and ulnar nerve subluxation at the elbow.   Patient does also have bilateral CMC OA and is managing this currently with injections.     Patient works as a  and does not report any change in his ability to perform his work tasks.   Patient Goals  Patient's goals regarding treatment: avoid surgery, reduce tingling.  Pain  No pain reported  Pain location: L RF.  Quality: tingling.  Alleviating factors: n/a.  Exacerbated by: n/a.  Progression: no change    Social Support    Employment status: working ()  Hand dominance: right      Diagnostic Tests  Ultrasound findings: abnormal      Objective     Observations     Left Wrist/Hand   Negative for atrophy, deformity and Wartenberg's sign.     Tenderness     Left Elbow   No tenderness in the cubital tunnel and medial epicondyle.     Left Wrist/Hand   No tenderness in the medial epicondyle.     Additional Tenderness Details  No palpable subluxation of the ulnar nerve at the cubital tunnel    Neurological Testing     Additional Neurological Details  Monofilament testing 0.07 g bilateral hands median and ulnar nerve distributions  Dermatomes/myotomes intact and symmetrical bilateral UE    Active Range of Motion     Left Elbow   Normal active range of motion    Right Elbow   Normal active range of motion    Additional Active Range of Motion Details  Proximal ROM:  Cervical spine AROM WNL and asymptomatic all planes  Shoulder AROM WNL and symmetrical bilaterally    Strength/Myotome Testing     Left Elbow   Normal " strength    Left Wrist/Hand   Normal wrist strength     (2nd hand position)     Trial 1: 122    Thumb Strength  Key/Lateral Pinch     Trial 1: 27  Palmar/Three-Point Pinch     Trial 1: 28    Right Wrist/Hand      (2nd hand position)     Trial 1: 125    Thumb Strength   Key/Lateral Pinch     Trial 1: 29  Palmar/Three-Point Pinch     Trial 1: 23    Additional Strength Details  Proximal strength WNL all planes    Tests     Left Shoulder   Positive ULTT4.   Negative Matthieu.     Left Elbow   Negative elbow flexion and Tinel's sign (cubital tunnel).              HEP: nerve glides 2x10; elbow flexion block at night time

## 2024-08-14 ENCOUNTER — OFFICE VISIT (OUTPATIENT)
Dept: OBGYN CLINIC | Facility: HOSPITAL | Age: 54
End: 2024-08-14
Payer: COMMERCIAL

## 2024-08-14 VITALS — WEIGHT: 294 LBS | HEIGHT: 71 IN | BODY MASS INDEX: 41.16 KG/M2

## 2024-08-14 DIAGNOSIS — M18.0 ARTHRITIS OF CARPOMETACARPAL (CMC) JOINT OF BOTH THUMBS: Primary | ICD-10-CM

## 2024-08-14 DIAGNOSIS — G56.02 CARPAL TUNNEL SYNDROME ON LEFT: ICD-10-CM

## 2024-08-14 DIAGNOSIS — G56.22 CUBITAL TUNNEL SYNDROME ON LEFT: ICD-10-CM

## 2024-08-14 PROCEDURE — 99213 OFFICE O/P EST LOW 20 MIN: CPT | Performed by: ORTHOPAEDIC SURGERY

## 2024-08-14 PROCEDURE — 20600 DRAIN/INJ JOINT/BURSA W/O US: CPT | Performed by: ORTHOPAEDIC SURGERY

## 2024-08-14 RX ORDER — ROPIVACAINE HYDROCHLORIDE 5 MG/ML
10 INJECTION, SOLUTION EPIDURAL; INFILTRATION; PERINEURAL
Status: COMPLETED | OUTPATIENT
Start: 2024-08-14 | End: 2024-08-14

## 2024-08-14 RX ORDER — BETAMETHASONE SODIUM PHOSPHATE AND BETAMETHASONE ACETATE 3; 3 MG/ML; MG/ML
3 INJECTION, SUSPENSION INTRA-ARTICULAR; INTRALESIONAL; INTRAMUSCULAR; SOFT TISSUE
Status: COMPLETED | OUTPATIENT
Start: 2024-08-14 | End: 2024-08-14

## 2024-08-14 RX ADMIN — BETAMETHASONE SODIUM PHOSPHATE AND BETAMETHASONE ACETATE 3 MG: 3; 3 INJECTION, SUSPENSION INTRA-ARTICULAR; INTRALESIONAL; INTRAMUSCULAR; SOFT TISSUE at 09:30

## 2024-08-14 RX ADMIN — ROPIVACAINE HYDROCHLORIDE 10 ML: 5 INJECTION, SOLUTION EPIDURAL; INFILTRATION; PERINEURAL at 09:30

## 2024-08-14 NOTE — PROGRESS NOTES
ASSESSMENT/PLAN:    Assessment:   Bilateral thumb MP joint arthritis  Bilateral thumb CMC arthritis  Left carpal tunnel  Left cubital tunnel    Plan:   Ultrasounds were reviewed with the patient in detail  He would like to try conservative treatment at this time as he has noted improvement in his numbness and tingling symptoms  He was advised to continue with a home exercise program  Patient was given bilateral cortisone injections to the CMC joint with betamethasone.  He tolerated well.  He was advised that we can repeat the cortisone injections every 3 to 4 months as needed for pain  He will follow-up in 3 months for reevaluation      Follow Up:  3 months    To Do Next Visit:  Reevaluation    General Discussions:  CMC Arthritis: The anatomy and physiology of carpometacarpal joint arthritis was discussed with the patient today in the office.  Deterioration of the articular cartilage eventually leads to hypermobility at the thumb CMC joint, resulting in joint subluxation, osteophyte formation, cystic changes within the trapezium and base of the first metacarpal, as well as subchondral sclerosis.  Eventually, pain, limited mobility, and compensatory hyperextension at the metacarpophalangeal joint may develop.  While normal activity and usage of the thumb joint may provide a painful experience to the patient, this typically does not result in damage to the thumb or hand.  Treatment options include resting thumb spica splints to decreased joint edema, pain, and inflammation.  Therapy exercises to strengthen the thenar musculature may relieve pain, but do not alter the overall continued development of osteoarthritis.  Oral medications, topical medications, corticosteroid injections may decrease pain and increase overall function.  Eventually, approximately 5% of patients may require surgical intervention.     _____________________________________________________  CHIEF COMPLAINT:  Chief Complaint   Patient presents with     Left Thumb - Follow-up     MCP- Beta     Right Thumb - Follow-up     MCP- Beta      SUBJECTIVE:  Fredo Blount is a 54 y.o. male who presents for follow up regarding ultrasounds for carpal tunnel and cubital tunnel.  As well as CMC arthritis.  He states that the pain is at the base of both of his thumbs.  He has noted improvement in his numbness and tingling into the left hand.    PAST MEDICAL HISTORY:  Past Medical History:   Diagnosis Date    Dysphagia     Esophageal dysmotility.  Eosinophilic esophagitis.  History of EGDs with Dr Colindres with empiric dilatation in 2009, 2011, 2012.    Mediastinal lymphadenopathy due to sarcoidosis     GABRIEL (obstructive sleep apnea) 12/5/2016    Last Assessment & Plan:  Formatting of this note might be different from the original. Very severe obstructive sleep apnea syndrome with very severe episodic hypoxia.  Will initiate auto CPAP at a range of 8-20 CWP.  The patient will use an interface of his choice.  Nightly use of CPAP and weight loss are recommended.       PAST SURGICAL HISTORY:  Past Surgical History:   Procedure Laterality Date    COLONOSCOPY  12/2015    Dr Goodwin at [facility]. Hyperplastic sigmoid colon polyp removed, otherwise normal.    EGD  2012    Dr Colindres, [facility]. Food bolus impaction, food at distal esophagus, pushed into stomach.    EGD  07/2011    Dr Colindres, [facility].  Dysphagia symptoms, no esophageal obstruction, esophageal dysmotility, empirically dilatated with 53 [place].  Biopsies eosinophilic esophagitis.    EGD  2009    Dr Colindres, [facility]. Symptoms of dysphagia, empirically dilatated with 53 [place].  History of eosinophilic esophagitis.       FAMILY HISTORY:  Family History   Problem Relation Age of Onset    Colon polyps Father     Colon cancer Paternal Grandfather     Stomach cancer Maternal Grandmother     Colon cancer Maternal Grandfather        SOCIAL HISTORY:  Social History     Tobacco Use    Smoking status: Never    Smokeless tobacco: Never   Substance Use  "Topics    Alcohol use: Yes       MEDICATIONS:    Current Outpatient Medications:     Ascorbic Acid (Vitamin C) 500 MG CAPS, Take 2,000 mg by mouth daily, Disp: , Rfl:     Ashwagandha 500 MG CAPS, Take by mouth, Disp: , Rfl:     B COMPLEX-C PO, Take 2 capsules by mouth daily, Disp: , Rfl:     COLLAGEN PO, Take by mouth, Disp: , Rfl:     Glutamine 500 MG CAPS, Take by mouth, Disp: , Rfl:     losartan (COZAAR) 25 mg tablet, , Disp: , Rfl:     Probiotic Product (PROBIOTIC MATURE ADULT PO), Take 2 capsules by mouth daily, Disp: , Rfl:     Sodium Sulfate-Mag Sulfate-KCl (Sutab) 5199-839-768 MG TABS, Take 1 kit by mouth see administration instructions, Disp: 24 tablet, Rfl: 0    ALLERGIES:  No Known Allergies    REVIEW OF SYSTEMS:  Pertinent items are noted in HPI.  A comprehensive review of systems was negative.    LABS:  HgA1c:   Lab Results   Component Value Date    HGBA1C 6.2 (H) 02/10/2024     BMP:   Lab Results   Component Value Date    CALCIUM 9.2 02/10/2024    K 4.6 02/10/2024    CO2 27 02/10/2024     02/10/2024    BUN 27 02/10/2024    CREATININE 0.94 02/10/2024     _____________________________________________________  PHYSICAL EXAMINATION:  Vital signs: Ht 5' 11\" (1.803 m)   Wt 133 kg (294 lb)   BMI 41.00 kg/m²   General: well developed and well nourished, alert, oriented times 3, and appears comfortable  Psychiatric: Normal  HEENT: Trachea Midline, No torticollis  Cardiovascular: No discernable arrhythmia  Pulmonary: No wheezing or stridor  Abdomen: No rebound or guarding  Extremities: No peripheral edema  Skin: No masses, erythema, lacerations, fluctation, ulcerations  Neurovascular: Sensation Intact to the Median, Ulnar, Radial Nerve, Motor Intact to the Median, Ulnar, Radial Nerve, and Pulses Intact    MUSCULOSKELETAL EXAMINATION:    Left wrist: no clawing, no wartenburg, no atrophy, 5/5 AIN, 5/5 APB, 5/5 intrinsics, 5/5 wrist flexion and extension, positive tinel's at wrist    Positive Tinel's at the " "elbow over the ulnar nerve  Subluxation of the ulnar nerve is appreciated on exam with hyperflexion      _____________________________________________________  STUDIES REVIEWED:  Ultrasound of the carpal tunnel demonstrated 13.7 sqmm  Ultrasound of the cubital tunnel demonstrated cross-sectional area10.2 sqmm with ulnar nerve dislocation anteriorly and flexion and extension      PROCEDURES PERFORMED:  Small joint arthrocentesis: bilateral thumb CMC  Universal Protocol:  Consent: Verbal consent obtained.  Risks and benefits: risks, benefits and alternatives were discussed  Consent given by: patient  Time out: Immediately prior to procedure a \"time out\" was called to verify the correct patient, procedure, equipment, support staff and site/side marked as required.  Patient understanding: patient states understanding of the procedure being performed  Patient consent: the patient's understanding of the procedure matches consent given  Site marked: the operative site was marked  Patient identity confirmed: verbally with patient  Supporting Documentation  Indications: pain   Procedure Details  Location: thumb - bilateral thumb CMC  Preparation: Patient was prepped and draped in the usual sterile fashion  Needle size: 25 G  Approach: volar    Medications (Right): 3 mg betamethasone acetate-betamethasone sodium phosphate 6 (3-3) mg/mL; 10 mL ropivacaine 0.5 %Medications (Left): 3 mg betamethasone acetate-betamethasone sodium phosphate 6 (3-3) mg/mL; 10 mL ropivacaine 0.5 %   Patient tolerance: patient tolerated the procedure well with no immediate complications  Dressing:  Sterile dressing applied                Scribe Attestation      I,:  Omari Turcios PA-C am acting as a scribe while in the presence of the attending physician.:       I,:  Lopez Chu MD personally performed the services described in this documentation    as scribed in my presence.:             "

## 2024-11-20 ENCOUNTER — OFFICE VISIT (OUTPATIENT)
Dept: OBGYN CLINIC | Facility: HOSPITAL | Age: 54
End: 2024-11-20
Payer: COMMERCIAL

## 2024-11-20 VITALS — BODY MASS INDEX: 41.16 KG/M2 | HEIGHT: 71 IN | WEIGHT: 294 LBS

## 2024-11-20 DIAGNOSIS — M18.0 ARTHRITIS OF CARPOMETACARPAL (CMC) JOINT OF BOTH THUMBS: Primary | ICD-10-CM

## 2024-11-20 PROCEDURE — 99213 OFFICE O/P EST LOW 20 MIN: CPT | Performed by: ORTHOPAEDIC SURGERY

## 2024-11-20 PROCEDURE — 20600 DRAIN/INJ JOINT/BURSA W/O US: CPT | Performed by: ORTHOPAEDIC SURGERY

## 2024-11-20 RX ADMIN — LIDOCAINE HYDROCHLORIDE 0.5 ML: 10 INJECTION, SOLUTION INFILTRATION; PERINEURAL at 09:00

## 2024-11-20 RX ADMIN — BETAMETHASONE SODIUM PHOSPHATE AND BETAMETHASONE ACETATE 3 MG: 3; 3 INJECTION, SUSPENSION INTRA-ARTICULAR; INTRALESIONAL; INTRAMUSCULAR; SOFT TISSUE at 09:00

## 2024-11-20 NOTE — PROGRESS NOTES
ASSESSMENT/PLAN:    Assessment:   Thumb CMC Arthritis  bilateral  Left cubital tunnel - resolved    Plan:   Risks and benefits of repeat bilateral thumb CMC CSI discussed, patient consented to and underwent the below procedure. Procedure tolerated well, post injection protocol advised.  Continue with splinting and OTC analgesics as needed for pain.   Activities as tolerated.    Follow Up:  3 months    To Do Next Visit:  Repeat evaluation of current issue    _____________________________________________________  CHIEF COMPLAINT:  Chief Complaint   Patient presents with    Left Thumb - Follow-up     CMC- Beta     Right Thumb - Follow-up     CMC- Beta          SUBJECTIVE:  Fredo Blount is a 54 y.o. male who presents for follow up regarding bilateral CMC osteoarthrits and left carpal and cubital tunnel.  Since last visit, Fredo Blount has tried CMC CSI with benefit. In regards to his left cubital tunnel symptoms have resolved. He notes a return of his aching CMC pain, left worse then right. Denies acute injury or trauma.   Radiation: None  Associated symptoms: Numbness and Stiffness/LROM  Handedness: right  Work status:     PAST MEDICAL HISTORY:  Past Medical History:   Diagnosis Date    Dysphagia     Esophageal dysmotility.  Eosinophilic esophagitis.  History of EGDs with Dr Colindres with empiric dilatation in 2009, 2011, 2012.    Mediastinal lymphadenopathy due to sarcoidosis     GABRIEL (obstructive sleep apnea) 12/5/2016    Last Assessment & Plan:  Formatting of this note might be different from the original. Very severe obstructive sleep apnea syndrome with very severe episodic hypoxia.  Will initiate auto CPAP at a range of 8-20 CWP.  The patient will use an interface of his choice.  Nightly use of CPAP and weight loss are recommended.       PAST SURGICAL HISTORY:  Past Surgical History:   Procedure Laterality Date    COLONOSCOPY  12/2015    Dr Goodwin at [facility]. Hyperplastic sigmoid colon polyp removed, otherwise normal.  "   EGD  2012    Dr Colindres, [facility]. Food bolus impaction, food at distal esophagus, pushed into stomach.    EGD  07/2011    Dr Colindres, [facility].  Dysphagia symptoms, no esophageal obstruction, esophageal dysmotility, empirically dilatated with 53 [place].  Biopsies eosinophilic esophagitis.    EGD  2009    Dr Colindres, [facility]. Symptoms of dysphagia, empirically dilatated with 53 [place].  History of eosinophilic esophagitis.       FAMILY HISTORY:  Family History   Problem Relation Age of Onset    Colon polyps Father     Colon cancer Paternal Grandfather     Stomach cancer Maternal Grandmother     Colon cancer Maternal Grandfather        SOCIAL HISTORY:  Social History     Tobacco Use    Smoking status: Never    Smokeless tobacco: Never   Substance Use Topics    Alcohol use: Yes       MEDICATIONS:    Current Outpatient Medications:     Ascorbic Acid (Vitamin C) 500 MG CAPS, Take 2,000 mg by mouth daily, Disp: , Rfl:     Ashwagandha 500 MG CAPS, Take by mouth, Disp: , Rfl:     B COMPLEX-C PO, Take 2 capsules by mouth daily, Disp: , Rfl:     COLLAGEN PO, Take by mouth, Disp: , Rfl:     Glutamine 500 MG CAPS, Take by mouth, Disp: , Rfl:     losartan (COZAAR) 25 mg tablet, , Disp: , Rfl:     Probiotic Product (PROBIOTIC MATURE ADULT PO), Take 2 capsules by mouth daily, Disp: , Rfl:     Sodium Sulfate-Mag Sulfate-KCl (Sutab) 4827-536-278 MG TABS, Take 1 kit by mouth see administration instructions, Disp: 24 tablet, Rfl: 0    ALLERGIES:  No Known Allergies    REVIEW OF SYSTEMS:  Pertinent items are noted in HPI.  A comprehensive review of systems was negative.    LABS:  HgA1c:   Lab Results   Component Value Date    HGBA1C 6.0 (H) 08/21/2024     BMP:   Lab Results   Component Value Date    CALCIUM 9.3 08/21/2024    K 5.1 08/21/2024    CO2 29 08/21/2024     08/21/2024    BUN 25 08/21/2024    CREATININE 1.08 08/21/2024           _____________________________________________________  PHYSICAL EXAMINATION:  Vital signs: Ht 5' 11\" " (1.803 m)   Wt 133 kg (294 lb)   BMI 41.00 kg/m²   General: well developed and well nourished, alert, oriented times 3, and appears comfortable  Psychiatric: Normal  HEENT: Trachea Midline, No torticollis  Cardiovascular: No discernable arrhythmia  Pulmonary: No wheezing or stridor  Abdomen: No rebound or guarding  Extremities: No peripheral edema  Skin: No masses, erythema, lacerations, fluctation, ulcerations  Neurovascular: Sensation Intact to the Median, Ulnar, Radial Nerve, Motor Intact to the Median, Ulnar, Radial Nerve, and Pulses Intact    MUSCULOSKELETAL EXAMINATION:    bilateral Hand -    Patient presents with no obvious anatomical deformity  Skin is warm and dry to touch with no signs of erythema, ecchymosis, or infection  No soft tissue swelling or effusion noted  TTP CMC - left  + CMC grind   5/5 intrinsic   5/5 APB bilateral   5/5 AIN  Full FDS, FDP, extensor mechanisms are intact  No rotational deformity with composite finger flexion  Demonstrates normal wrist, elbow, and shoulder motion  Forearm compartments are soft and supple  2+ distal radial pulse with brisk capillary refill to the fingers  Radial, median, and ulnar motor and sensory distribution intact  Sensations light to touch intact distally    _____________________________________________________  STUDIES REVIEWED:  No Studies to review      PROCEDURES PERFORMED:  Small joint arthrocentesis: bilateral thumb CMC  [place] Protocol:  procedure performed by consultantConsent: Verbal consent obtained.  Risks and benefits: risks, benefits and alternatives were discussed  Consent given by: patient  Timeout called at: 11/20/2024 9:06 AM.  Patient understanding: patient states understanding of the procedure being performed  Patient identity confirmed: verbally with patient  Supporting Documentation  Indications: pain and diagnostic evaluation   Procedure Details  Location: thumb - bilateral thumb CMC  Preparation: Patient was prepped and draped  in the usual sterile fashion  Needle size: 25 G  Ultrasound guidance: no    Medications (Right): 3 mg betamethasone acetate-betamethasone sodium phosphate 6 (3-3) mg/mL; 0.5 mL lidocaine 1 %Medications (Left): 3 mg betamethasone acetate-betamethasone sodium phosphate 6 (3-3) mg/mL; 0.5 mL lidocaine 1 %   Patient tolerance: patient tolerated the procedure well with no immediate complications  Dressing:  Sterile dressing applied              Scribe Attestation      I,:  Samantha Vasquez am acting as a scribe while in the presence of the attending physician.:       I,:  Lopez Chu MD personally performed the services described in this documentation    as scribed in my presence.:

## 2024-11-23 RX ORDER — LIDOCAINE HYDROCHLORIDE 10 MG/ML
0.5 INJECTION, SOLUTION INFILTRATION; PERINEURAL
Status: COMPLETED | OUTPATIENT
Start: 2024-11-20 | End: 2024-11-20

## 2024-11-23 RX ORDER — BETAMETHASONE SODIUM PHOSPHATE AND BETAMETHASONE ACETATE 3; 3 MG/ML; MG/ML
3 INJECTION, SUSPENSION INTRA-ARTICULAR; INTRALESIONAL; INTRAMUSCULAR; SOFT TISSUE
Status: COMPLETED | OUTPATIENT
Start: 2024-11-20 | End: 2024-11-20

## 2025-03-12 ENCOUNTER — OFFICE VISIT (OUTPATIENT)
Dept: OBGYN CLINIC | Facility: HOSPITAL | Age: 55
End: 2025-03-12
Payer: COMMERCIAL

## 2025-03-12 VITALS — BODY MASS INDEX: 41 KG/M2 | HEIGHT: 71 IN

## 2025-03-12 DIAGNOSIS — M18.0 ARTHRITIS OF CARPOMETACARPAL (CMC) JOINT OF BOTH THUMBS: Primary | ICD-10-CM

## 2025-03-12 PROCEDURE — 20600 DRAIN/INJ JOINT/BURSA W/O US: CPT | Performed by: ORTHOPAEDIC SURGERY

## 2025-03-12 PROCEDURE — 99213 OFFICE O/P EST LOW 20 MIN: CPT | Performed by: ORTHOPAEDIC SURGERY

## 2025-03-12 RX ORDER — BETAMETHASONE SODIUM PHOSPHATE AND BETAMETHASONE ACETATE 3; 3 MG/ML; MG/ML
3 INJECTION, SUSPENSION INTRA-ARTICULAR; INTRALESIONAL; INTRAMUSCULAR; SOFT TISSUE
Status: COMPLETED | OUTPATIENT
Start: 2025-03-12 | End: 2025-03-12

## 2025-03-12 RX ORDER — LIDOCAINE HYDROCHLORIDE 10 MG/ML
1 INJECTION, SOLUTION INFILTRATION; PERINEURAL
Status: COMPLETED | OUTPATIENT
Start: 2025-03-12 | End: 2025-03-12

## 2025-03-12 RX ADMIN — LIDOCAINE HYDROCHLORIDE 1 ML: 10 INJECTION, SOLUTION INFILTRATION; PERINEURAL at 14:15

## 2025-03-12 RX ADMIN — BETAMETHASONE SODIUM PHOSPHATE AND BETAMETHASONE ACETATE 3 MG: 3; 3 INJECTION, SUSPENSION INTRA-ARTICULAR; INTRALESIONAL; INTRAMUSCULAR; SOFT TISSUE at 14:15

## 2025-03-12 NOTE — PROGRESS NOTES
ORTHOPAEDIC HAND, WRIST, AND ELBOW OFFICE  VISIT     Name: Fredo Blount      : 1970      MRN: [id number]  Encounter Provider: Lopez Chu MD  Encounter Date: 3/12/2025   Encounter department: [facility] [place]  :  Assessment & Plan  Arthritis of carpometacarpal (CMC) joint of both thumbs  -Discussed treatment options including continued observation, activity modification, bracing, anti-inflammatories, hand therapy, cortisone injection, versus surgical intervention.  -Patient was offered and accepted cortisone injections to bilateral thumb CMC joints at time of visit with Cara, he tolerated the procedure well  -Patient will follow-up in the office in 3 months for reevaluation of symptoms  -The patient expresses understanding and is in agreement with today's treatment plan.   Orders:  •  Small joint arthrocentesis: bilateral thumb CMC  •  lidocaine (XYLOCAINE) 1 % injection 1 mL  •  lidocaine (XYLOCAINE) 1 % injection 1 mL  •  betamethasone acetate-betamethasone sodium phosphate (CELESTONE) injection 3 mg  •  betamethasone acetate-betamethasone sodium phosphate (CELESTONE) injection 3 mg          General Discussions:  CMC Arthritis: The anatomy and physiology of carpometacarpal joint arthritis was discussed with the patient today in the office.  Deterioration of the articular cartilage eventually leads to hypermobility at the thumb CMC joint, resulting in joint subluxation, osteophyte formation, cystic changes within the trapezium and base of the first metacarpal, as well as subchondral sclerosis.  Eventually, pain, limited mobility, and compensatory hyperextension at the metacarpophalangeal joint may develop.  While normal activity and usage of the thumb joint may provide a painful experience to the patient, this typically does not result in damage to the thumb or hand.  Treatment options include resting thumb spica splints to decreased joint edema, pain, and  "inflammation.  Therapy exercises to strengthen the thenar musculature may relieve pain, but do not alter the overall continued development of osteoarthritis.  Oral medications, topical medications, dietary changes, and corticosteroid injections may decrease pain and increase overall function.  Eventually, approximately 10-20% of patients may require surgical intervention.       Operative Discussions:      History of Present Illness   HPI  Chief Complaint   Patient presents with   • Left Thumb - Follow-up     CMC- Beta    • Right Thumb - Follow-up     CMC- Beta        Fredo Blount is a 54 y.o. male who presents with bilateral thumb CMC osteoarthritis. Patient states that the previous thumb CMC injections have worked well on the left thumb however the right did not feel as \"full\" last time it was injected and states that it had only worked for approximately 4-5 weeks before the pain returned. Patient states that he would like to proceed with bilateral thumb CMC injections today.      REVIEW OF SYSTEMS:  General: no fever, no chills  HEENT:  No loss of hearing or eyesight problems  Eyes:  No red eyes  Respiratory:  No coughing, shortness of breath or wheezing  Cardiovascular:  No chest pain, no palpitations  GI:  Abdomen soft nontender, denies nausea  Endocrine:  No muscle weakness, no frequent urination, no excessive thirst  Urinary:  No dysuria, no incontinence  Musculoskeletal: see HPI and PE  SKIN:  No skin rash, no dry skin  Neurological:  No headaches, no confusion  Psychiatric:  No suicide thoughts, no anxiety, no depression  Review of all other systems is negative    Objective   Ht 5' 11\" (1.803 m)   BMI 41.00 kg/m²      General: well developed and well nourished, alert, oriented times 3, and appears comfortable  Psychiatric: Normal  HEENT: Trachea Midline, No torticollis  Cardiovascular: No discernable arrhythmia  Pulmonary: No wheezing or stridor  Abdomen: No rebound or guarding  Extremities: No peripheral " edema  Skin: No masses, erythema, lacerations, fluctation, ulcerations  Neurovascular: Sensation Intact to the Median, Ulnar, Radial Nerve, Motor Intact to the Median, Ulnar, Radial Nerve, and Pulses Intact    Musculoskeletal exam:  LEFT SIDE:  CMC: Negative Shoulder Sign, No Instability, Positive grind, and Positive tendnerness CMC  RIGHT SIDE:  CMC: Negative Shoulder Sign, Positive grind, and Positive tendnerness CMC      STUDIES REVIEWED:  No Studies to review      PROCEDURES PERFORMED:  Small joint arthrocentesis: bilateral thumb CMC  [place] Protocol:  procedure performed by consultantConsent: Verbal consent obtained.  Risks and benefits: risks, benefits and alternatives were discussed  Consent given by: patient  Patient understanding: patient states understanding of the procedure being performed  Patient consent: the patient's understanding of the procedure matches consent given  Site marked: the operative site was marked  Patient identity confirmed: verbally with patient  Supporting Documentation  Indications: pain and joint swelling   Procedure Details  Location: thumb - bilateral thumb CMC  Preparation: Patient was prepped and draped in the usual sterile fashion  Needle size: 25 G  Ultrasound guidance: no    Medications (Right): 1 mL lidocaine 1 %; 3 mg betamethasone acetate-betamethasone sodium phosphate 6 (3-3) mg/mLMedications (Left): 1 mL lidocaine 1 %; 3 mg betamethasone acetate-betamethasone sodium phosphate 6 (3-3) mg/mL   Patient tolerance: patient tolerated the procedure well with no immediate complications  Dressing:  Sterile dressing applied               Scribe Attestation    I,:  Aziza Alcantar am acting as a scribe while in the presence of the attending physician.:       I,:  Lopez Chu MD personally performed the services described in this documentation    as scribed in my presence.:

## 2025-08-07 ENCOUNTER — TELEPHONE (OUTPATIENT)
Dept: OBGYN CLINIC | Facility: HOSPITAL | Age: 55
End: 2025-08-07